# Patient Record
Sex: MALE | Race: WHITE | Employment: FULL TIME | URBAN - METROPOLITAN AREA
[De-identification: names, ages, dates, MRNs, and addresses within clinical notes are randomized per-mention and may not be internally consistent; named-entity substitution may affect disease eponyms.]

---

## 2019-07-13 ENCOUNTER — HOSPITAL ENCOUNTER (EMERGENCY)
Facility: CLINIC | Age: 69
Discharge: HOME | End: 2019-07-13
Attending: EMERGENCY MEDICINE | Admitting: EMERGENCY MEDICINE
Payer: COMMERCIAL

## 2019-07-13 VITALS
DIASTOLIC BLOOD PRESSURE: 90 MMHG | RESPIRATION RATE: 18 BRPM | TEMPERATURE: 98.4 F | HEIGHT: 72 IN | SYSTOLIC BLOOD PRESSURE: 140 MMHG | HEART RATE: 80 BPM | OXYGEN SATURATION: 98 % | BODY MASS INDEX: 26.32 KG/M2 | WEIGHT: 194.31 LBS

## 2019-07-13 DIAGNOSIS — G56.22 ULNAR NEUROPATHY AT WRIST, LEFT: ICD-10-CM

## 2019-07-13 PROCEDURE — 99282 EMERGENCY DEPT VISIT SF MDM: CPT | Mod: Z6 | Performed by: EMERGENCY MEDICINE

## 2019-07-13 PROCEDURE — 99281 EMR DPT VST MAYX REQ PHY/QHP: CPT | Performed by: EMERGENCY MEDICINE

## 2019-07-13 RX ORDER — FUROSEMIDE 40 MG
60 TABLET ORAL 2 TIMES DAILY
COMMUNITY

## 2019-07-13 RX ORDER — DILTIAZEM HCL 90 MG
180 TABLET ORAL 3 TIMES DAILY
COMMUNITY

## 2019-07-13 RX ORDER — ASPIRIN 81 MG/1
81 TABLET ORAL DAILY
COMMUNITY

## 2019-07-13 RX ORDER — AMOXICILLIN 500 MG
1200 CAPSULE ORAL DAILY
COMMUNITY

## 2019-07-13 RX ORDER — LABETALOL 200 MG/1
200 TABLET, FILM COATED ORAL 2 TIMES DAILY
COMMUNITY

## 2019-07-13 RX ORDER — HYDRALAZINE HYDROCHLORIDE 50 MG/1
50 TABLET, FILM COATED ORAL 2 TIMES DAILY
COMMUNITY

## 2019-07-13 ASSESSMENT — ENCOUNTER SYMPTOMS
FACIAL ASYMMETRY: 0
WOUND: 1
CONFUSION: 0
NUMBNESS: 1
ARTHRALGIAS: 0
SPEECH DIFFICULTY: 0
DIZZINESS: 0
LIGHT-HEADEDNESS: 0
NECK PAIN: 0
JOINT SWELLING: 0
WEAKNESS: 0
NECK STIFFNESS: 0
HEADACHES: 0

## 2019-07-13 ASSESSMENT — MIFFLIN-ST. JEOR: SCORE: 1699.4

## 2019-07-13 NOTE — ED TRIAGE NOTES
68 year old male presents with complaints of awakening with numbness in left hand today.  Patient denies any difficulty with gait, speech, weakness, or confusion.  Patient is away from home and is sleeping in a bed instead of his usual recliner at home.  Patient denies any trauma to the arm.

## 2019-07-13 NOTE — ED PROVIDER NOTES
"    [place] EMERGENCY DEPARTMENT ([facility])  7/13/19    History     Chief Complaint   Patient presents with     Numbness     The history is provided by the patient.     Gerard Mclean is a 68 year old male with a past medical history significant for atrial fibrillation, HTN and prostate hypertrophy who presents here to the Emergency Department due to left finger numbness. Patient reports that he woke up this morning, around 7:15 AM, to numbness in his left fingers. He states that he went to bed around 1:30 AM and was feeling normal at the time. Notes that he had slept more \"soundly\" than normal and did not move much during the night. Patient is visiting from out of town and states that he normally sleeps in a recliner. He does note that last night he had slept supine in a bed. Patient notes similar symptoms in the past, however, they have never lasted this long. Patient is anticoagulated on warfarin. Denies weakness, dizziness, change in gait, change in speech or confusion. Patient is left handed. He denies difficulty with coordination and has been writing with usual dexterity.    I have reviewed the Medications, Allergies, Past Medical and Surgical History, and Social History in the [employer] system.    Past Medical History:   Diagnosis Date     Atrial fibrillation (H)      Hypertension      Prostate hypertrophy        Past Surgical History:   Procedure Laterality Date     CARDIAC SURGERY      mitral valve     HERNIA REPAIR         History reviewed. No pertinent family history.    Social History     Tobacco Use     Smoking status: Never Smoker     Smokeless tobacco: Never Used   Substance Use Topics     Alcohol use: Not Currently       No current facility-administered medications for this encounter.      Current Outpatient Medications   Medication     aspirin 81 MG EC tablet     DIGOXIN PO     diltiazem (CARDIZEM) 90 MG tablet     furosemide (LASIX) 40 MG tablet     hydrALAZINE (APRESOLINE) 50 MG tablet     " labetalol (NORMODYNE) 200 MG tablet     multivitamins w/minerals (CERTAVITE) liquid     Omega-3 Fatty Acids (FISH OIL) 1200 MG capsule     POTASSIUM BICARBONATE PO     TAMSULOSIN HCL PO        Allergies   Allergen Reactions     Azithromycin Other (See Comments)     anxiety         Review of Systems   Eyes: Negative for visual disturbance.   Musculoskeletal: Negative for arthralgias, gait problem, joint swelling, neck pain and neck stiffness.   Skin: Positive for wound. Negative for rash.   Neurological: Positive for numbness (Left fingers). Negative for dizziness, facial asymmetry, speech difficulty, weakness, light-headedness and headaches.   Psychiatric/Behavioral: Negative for confusion.   All other systems reviewed and are negative.      Physical Exam   BP: (!) 142/92  Pulse: 87  Temp: 98.4  F (36.9  C)  Resp: 16  Height: 182.9 cm (6')  Weight: 88.1 kg (194 lb 5 oz)  SpO2: 97 %      Physical Exam   Constitutional: He is oriented to person, place, and time. He appears well-developed and well-nourished. No distress.   HENT:   Head: Normocephalic and atraumatic.   Nose: Nose normal.   Eyes: Pupils are equal, round, and reactive to light. Conjunctivae and EOM are normal. No scleral icterus.   Neck: Normal range of motion. Neck supple.   Cardiovascular: Normal rate.   Pulmonary/Chest: Effort normal. No stridor. No respiratory distress.   Abdominal: He exhibits no distension.   Musculoskeletal: Normal range of motion. He exhibits no deformity.        Left elbow: Normal. He exhibits normal range of motion, no swelling and no deformity. No tenderness found. No medial epicondyle tenderness noted.        Left forearm: Normal.        Left hand: He exhibits laceration. He exhibits normal range of motion, no tenderness, no bony tenderness, normal capillary refill, no deformity and no swelling. Decreased sensation noted. Decreased sensation is present in the ulnar distribution and is present in the medial distribution.  Decreased sensation is not present in the radial distribution. Normal strength noted. He exhibits no finger abduction, no thumb/finger opposition and no wrist extension trouble.        Hands:  Small skin tear and ecchymoses on dorsal aspect of first interspace of left hand. No redness, drainage, fluctuance. Subjective decrease in sensation along distal tips of middle, ring, and small fingers of left hand. Normal ROM. Normal distal perfusion.    Neurological: He is alert and oriented to person, place, and time.   Skin: Skin is warm and dry. No rash noted. He is not diaphoretic.   Psychiatric: He has a normal mood and affect. His behavior is normal. Thought content normal.   Nursing note and vitals reviewed.      ED Course   2:29 PM  The patient was seen and examined by Kerry Segal MD in Room ED12.        Procedures             Critical Care time:  none             Labs Ordered and Resulted from Time of ED Arrival Up to the Time of Departure from the ED - No data to display         Assessments & Plan (with Medical Decision Making)   Gerard Mclean is a 68 year old male with a past medical history significant for atrial fibrillation, HTN and prostate hypertrophy who presents here to the Emergency Department due to left hand numbness.    Ddx: ulnar/median neuropathy, peripheral neuropathy, compression neuropathy    Patient with isolated sensory deficit (no dense anesthesia, just subjectively diminished) along ulnar and part of median nerve distribution of left distal fingers. Normal perfusion and strength on exam. No wound infection. Likely compression neuropathy since it started after awakening from an unusually deep sleep. LKW 1:30AM yesterday. Low NIHSS and outside window for TPA.  Patient anticoagulated on warfarin so lower risk for CVA. Presenting s/s c/w peripherally localizing lesion rather than CNS lesion or stroke. No headache, neck pain, or vision changes. No signs of wound infection or swelling around  minor skin tear on left hand that would cause neuropathic symptoms. Reassurance provided. Patient comfortable with deferring further stroke work up after reviewing the above rationalizations. Patient encouraged to make sure he does not compress his elbow or wrist while sleeping. Follow up with PCP asap if symptoms do not resolve in 3-7 days. Return to the ED for worsening symptoms such as weakness, confusion, headache, new or worsening paresthesias, slurred speech, other signs of stroke.       I have reviewed the nursing notes.    I have reviewed the findings, diagnosis, plan and need for follow up with the patient.       Medication List      There are no discharge medications for this visit.         Final diagnoses:   Ulnar neuropathy at wrist, left     ITheodore, am serving as a trained medical scribe to document services personally performed by Kerry Segal MD, based on the provider's statements to me.   Kerry TORRES MD, was physically present and have reviewed and verified the accuracy of this note documented by Theodore Chow.    7/13/2019   [facility], [place], EMERGENCY DEPARTMENT     Kerry Segal MD  07/13/19 1937

## 2019-07-13 NOTE — DISCHARGE INSTRUCTIONS
Please make an appointment to follow up with Your Primary Care Provider in 2-3 days unless symptoms completely resolve.    Return to the ED for worsening numbness, tingling, weakness, headache, neck pain, vision changes, confusion, slurred speech, or dizziness.

## 2019-07-13 NOTE — ED AVS SNAPSHOT
[facility], [place], Emergency Department  [address] [place] [id number]  Phone:  [phone]                                    Gerard Mclean   MRN: [id number]    Department:  [facility], Emergency Department   Date of Visit:  7/13/2019           After Visit Summary Signature Page    I have received my discharge instructions, and my questions have been answered. I have discussed any challenges I see with this plan with the nurse or doctor.    ..........................................................................................................................................  Patient/Patient Representative Signature      ..........................................................................................................................................  Patient Representative Print Name and Relationship to Patient    ..................................................               ................................................  Date                                   Time    ..........................................................................................................................................  Reviewed by Signature/Title    ...................................................              ..............................................  Date                                               Time          22EPIC Rev 08/18